# Patient Record
Sex: FEMALE | URBAN - METROPOLITAN AREA
[De-identification: names, ages, dates, MRNs, and addresses within clinical notes are randomized per-mention and may not be internally consistent; named-entity substitution may affect disease eponyms.]

---

## 2022-06-20 ENCOUNTER — OFFICE VISIT (OUTPATIENT)
Dept: FAMILY MEDICINE CLINIC | Age: 27
End: 2022-06-20
Payer: COMMERCIAL

## 2022-06-20 VITALS
OXYGEN SATURATION: 98 % | DIASTOLIC BLOOD PRESSURE: 60 MMHG | HEIGHT: 64 IN | BODY MASS INDEX: 20.38 KG/M2 | HEART RATE: 118 BPM | WEIGHT: 119.4 LBS | RESPIRATION RATE: 16 BRPM | SYSTOLIC BLOOD PRESSURE: 100 MMHG

## 2022-06-20 DIAGNOSIS — Z00.00 ANNUAL PHYSICAL EXAM: Primary | ICD-10-CM

## 2022-06-20 DIAGNOSIS — F33.41 RECURRENT MAJOR DEPRESSIVE DISORDER, IN PARTIAL REMISSION (HCC): ICD-10-CM

## 2022-06-20 PROCEDURE — G8427 DOCREV CUR MEDS BY ELIG CLIN: HCPCS | Performed by: PHYSICIAN ASSISTANT

## 2022-06-20 PROCEDURE — 99385 PREV VISIT NEW AGE 18-39: CPT | Performed by: PHYSICIAN ASSISTANT

## 2022-06-20 PROCEDURE — G8420 CALC BMI NORM PARAMETERS: HCPCS | Performed by: PHYSICIAN ASSISTANT

## 2022-06-20 PROCEDURE — 99203 OFFICE O/P NEW LOW 30 MIN: CPT | Performed by: PHYSICIAN ASSISTANT

## 2022-06-20 PROCEDURE — 1036F TOBACCO NON-USER: CPT | Performed by: PHYSICIAN ASSISTANT

## 2022-06-20 RX ORDER — PNV NO.118/IRON FUMARATE/FA 29 MG-1 MG
1 TABLET,CHEWABLE ORAL DAILY
COMMUNITY

## 2022-06-20 RX ORDER — SERTRALINE HYDROCHLORIDE 25 MG/1
25 TABLET, FILM COATED ORAL DAILY
COMMUNITY
Start: 2022-06-14 | End: 2022-06-20 | Stop reason: SDUPTHER

## 2022-06-20 RX ORDER — SERTRALINE HYDROCHLORIDE 25 MG/1
25 TABLET, FILM COATED ORAL DAILY
Qty: 90 TABLET | Refills: 0 | Status: SHIPPED | OUTPATIENT
Start: 2022-06-20

## 2022-06-20 ASSESSMENT — PATIENT HEALTH QUESTIONNAIRE - PHQ9
SUM OF ALL RESPONSES TO PHQ QUESTIONS 1-9: 5
SUM OF ALL RESPONSES TO PHQ QUESTIONS 1-9: 5
10. IF YOU CHECKED OFF ANY PROBLEMS, HOW DIFFICULT HAVE THESE PROBLEMS MADE IT FOR YOU TO DO YOUR WORK, TAKE CARE OF THINGS AT HOME, OR GET ALONG WITH OTHER PEOPLE: 1
7. TROUBLE CONCENTRATING ON THINGS, SUCH AS READING THE NEWSPAPER OR WATCHING TELEVISION: 0
SUM OF ALL RESPONSES TO PHQ QUESTIONS 1-9: 5
4. FEELING TIRED OR HAVING LITTLE ENERGY: 1
8. MOVING OR SPEAKING SO SLOWLY THAT OTHER PEOPLE COULD HAVE NOTICED. OR THE OPPOSITE, BEING SO FIGETY OR RESTLESS THAT YOU HAVE BEEN MOVING AROUND A LOT MORE THAN USUAL: 0
5. POOR APPETITE OR OVEREATING: 0
1. LITTLE INTEREST OR PLEASURE IN DOING THINGS: 1
9. THOUGHTS THAT YOU WOULD BE BETTER OFF DEAD, OR OF HURTING YOURSELF: 0
SUM OF ALL RESPONSES TO PHQ9 QUESTIONS 1 & 2: 2
SUM OF ALL RESPONSES TO PHQ QUESTIONS 1-9: 5
2. FEELING DOWN, DEPRESSED OR HOPELESS: 1
3. TROUBLE FALLING OR STAYING ASLEEP: 1
6. FEELING BAD ABOUT YOURSELF - OR THAT YOU ARE A FAILURE OR HAVE LET YOURSELF OR YOUR FAMILY DOWN: 1

## 2022-06-20 NOTE — PATIENT INSTRUCTIONS
Healthy Living Recommendations:  -Exercise 150 minutes/ week to include aerobic and weights. Body Mass Index (BMI) should be 25 or less. -Diet: EAT: a salad and at least one other vegetable a day. Must contain a green leafy veg. Berries, beans, legumes. Whole grains. Fish ( not fried), Poultry. Use olive oil. Avoid foods made with raw sugar such as candy, cookies, and drinks with sugar. Avoid greasy, fried, and processed foods. Studies show that obesity,  diets high in red meat and processed foods, tobacco use, alcohol abuse, and a sedentary lifestyle WILL increase the risk of developing heart and liver disease, increase cancers, and dementias.  -Eye exam every 2 years over age 36.   -Dental exam every 6 months.  -Colonoscopy at age 39  Females: perform monthly skin exam,  self breast exam and yearly mammograms  Males: perform monthly skin exam and testicular exam.      NEW to PROVIDER:     Lee Hinojosa, [address], [facility]  Office hours are: Monday - Friday 7 am- 5 pm. Phone lines turn on at 8 am.   Office [facility]: [phone]     -Please call your pharmacy \ for medication refills.  -Please be sure to call our office if your illness/problem that has been treated has not completely resolved. -Bring an accurate list of your medications with you at every appointment to ensure that we have the correct information.  -Arrive 15 minutes prior to appointments.

## 2022-06-20 NOTE — PROGRESS NOTES
No current facility-administered medications for this visit. PHYSICAL EXAM:   Vitals:    06/20/22 1600   BP: 100/60   Site: Left Upper Arm   Position: Sitting   Pulse: (!) 118   Resp: 16   SpO2: 98%   Weight: 119 lb 6.4 oz (54.2 kg)   Height: 5' 4.4\" (1.636 m)       Body mass index is 20.24 kg/m². GENERAL APPEARANCE:  Well nourished. No distress. HEENT: Normocephalic. Atraumatic. EYES: Vision intact. EOMI, PERRLA. Conjunctivae pink, moist. Sclera white. Cornea and lens without opacities. EARS: Auricles symmetrical without lesions or deformities. Canals are clear. TM's intact bilaterally. Hearing  intact. NOSE: patent. MOUTH:  Moist, teeth intact. Throat clear. NECK: No lymphadenopathy. Thyroid smooth, not enlarged. LUNGS: Clear to auscultation,. No wheezes, rales, or rhonci. Equal resonance to chest percussion. CHEST/SPINE: No skin changes or chest wall deformities. No CVAT. No spine or paraspinal muscle tenderness or deformities. Full range of motion in all planes. HEART:  Regular rate and rhythm. No murmurs, rubs, or gallops. VASCULAR:  No jugular venous distension or carotid bruits. Pulses equal and symmetrical upper and lower extremities. Capillary refill <3secs. ABDOMEN: Without scars. Soft, non-tender, normoactive bowel sounds. No pulsatile masses or hepatosplenomegaly. EXTREMITIES: No skin or bony deformities. Symmetrical strength. Full range of motion without eliciting pain. Sensation and DTR's are equal and intact. NEUROLOGIC: Grossly non focal. Cranial Nerves II-XII intact. Negative Rhomberg. SKIN: Warm, dry and intact. No rashes, petechia, purpura. No suspicious lesions. No nail clubbing or cyanosis or edema. PSYCHIATRIC:  Mood, behavior, and judgement normal. Thought content normal.      ADDITIONAL DATA:  Prior clinic visit notes, labs, and imaging reports were reviewed.   Orders Placed This Encounter   Procedures    Comprehensive Metabolic Panel    Lipid Panel    Ambulatory referral to Social Work        ASSESSMENT & PLAN:  Carlota Parker was seen today for establish care. Diagnoses and all orders for this visit:    Annual physical exam  -     Comprehensive Metabolic Panel; Future  -     Lipid Panel; Future   - Medical records updated. - Recommendations for healthy living discussed: refuses covid, discussed in detail. More exercise. Recurrent major depressive disorder, in partial remission (HCC)  -     sertraline (ZOLOFT) 25 MG tablet;  Take 1 tablet by mouth daily  -     Ambulatory referral to Social Work  - Follow up 6 mo for med check      Breast feeding mother  In month 6       Electronically signed by YU Phelan on 6/20/2022 at 4:31 PM